# Patient Record
Sex: MALE | Race: BLACK OR AFRICAN AMERICAN | NOT HISPANIC OR LATINO | Employment: UNEMPLOYED | URBAN - NONMETROPOLITAN AREA
[De-identification: names, ages, dates, MRNs, and addresses within clinical notes are randomized per-mention and may not be internally consistent; named-entity substitution may affect disease eponyms.]

---

## 2022-03-15 ENCOUNTER — HOSPITAL ENCOUNTER (EMERGENCY)
Facility: HOSPITAL | Age: 37
Discharge: HOME | End: 2022-03-15
Attending: FAMILY MEDICINE | Admitting: FAMILY MEDICINE

## 2022-03-15 ENCOUNTER — APPOINTMENT (OUTPATIENT)
Dept: GENERAL RADIOLOGY | Facility: HOSPITAL | Age: 37
End: 2022-03-15

## 2022-03-15 VITALS
WEIGHT: 175.6 LBS | BODY MASS INDEX: 28.22 KG/M2 | HEART RATE: 76 BPM | SYSTOLIC BLOOD PRESSURE: 118 MMHG | RESPIRATION RATE: 20 BRPM | OXYGEN SATURATION: 97 % | HEIGHT: 66 IN | DIASTOLIC BLOOD PRESSURE: 71 MMHG | TEMPERATURE: 98.4 F

## 2022-03-15 DIAGNOSIS — R06.02 SHORTNESS OF BREATH: Primary | ICD-10-CM

## 2022-03-15 LAB
ALBUMIN SERPL-MCNC: 4.5 G/DL (ref 3.5–5.2)
ALBUMIN/GLOB SERPL: 1.6 G/DL
ALP SERPL-CCNC: 62 U/L (ref 39–117)
ALT SERPL W P-5'-P-CCNC: 58 U/L (ref 1–41)
ANION GAP SERPL CALCULATED.3IONS-SCNC: 8 MMOL/L (ref 5–15)
ANISOCYTOSIS BLD QL: NORMAL
AST SERPL-CCNC: 33 U/L (ref 1–40)
BASOPHILS # BLD AUTO: 0.05 10*3/MM3 (ref 0–0.2)
BASOPHILS NFR BLD AUTO: 0.8 % (ref 0–1.5)
BILIRUB SERPL-MCNC: 0.3 MG/DL (ref 0–1.2)
BUN SERPL-MCNC: 13 MG/DL (ref 6–20)
BUN/CREAT SERPL: 11 (ref 7–25)
CALCIUM SPEC-SCNC: 9.3 MG/DL (ref 8.6–10.5)
CHLORIDE SERPL-SCNC: 102 MMOL/L (ref 98–107)
CO2 SERPL-SCNC: 28 MMOL/L (ref 22–29)
CREAT SERPL-MCNC: 1.18 MG/DL (ref 0.76–1.27)
DEPRECATED RDW RBC AUTO: 35.6 FL (ref 37–54)
EGFRCR SERPLBLD CKD-EPI 2021: 82 ML/MIN/1.73
EOSINOPHIL # BLD AUTO: 0.38 10*3/MM3 (ref 0–0.4)
EOSINOPHIL NFR BLD AUTO: 5.7 % (ref 0.3–6.2)
ERYTHROCYTE [DISTWIDTH] IN BLOOD BY AUTOMATED COUNT: 18.6 % (ref 12.3–15.4)
GLOBULIN UR ELPH-MCNC: 2.8 GM/DL
GLUCOSE SERPL-MCNC: 98 MG/DL (ref 65–99)
HCT VFR BLD AUTO: 41.8 % (ref 37.5–51)
HGB BLD-MCNC: 13.6 G/DL (ref 13–17.7)
HOLD SPECIMEN: NORMAL
HYPOCHROMIA BLD QL: NORMAL
IMM GRANULOCYTES # BLD AUTO: 0.04 10*3/MM3 (ref 0–0.05)
IMM GRANULOCYTES NFR BLD AUTO: 0.6 % (ref 0–0.5)
LYMPHOCYTES # BLD AUTO: 1.83 10*3/MM3 (ref 0.7–3.1)
LYMPHOCYTES NFR BLD AUTO: 27.5 % (ref 19.6–45.3)
MCH RBC QN AUTO: 20.5 PG (ref 26.6–33)
MCHC RBC AUTO-ENTMCNC: 32.5 G/DL (ref 31.5–35.7)
MCV RBC AUTO: 62.9 FL (ref 79–97)
MICROCYTES BLD QL: NORMAL
MONOCYTES # BLD AUTO: 0.53 10*3/MM3 (ref 0.1–0.9)
MONOCYTES NFR BLD AUTO: 8 % (ref 5–12)
NEUTROPHILS NFR BLD AUTO: 3.83 10*3/MM3 (ref 1.7–7)
NEUTROPHILS NFR BLD AUTO: 57.4 % (ref 42.7–76)
NRBC BLD AUTO-RTO: 0 /100 WBC (ref 0–0.2)
NT-PROBNP SERPL-MCNC: 5.4 PG/ML (ref 0–450)
OVALOCYTES BLD QL SMEAR: NORMAL
PLATELET # BLD AUTO: 277 10*3/MM3 (ref 140–450)
PMV BLD AUTO: 10.1 FL (ref 6–12)
POIKILOCYTOSIS BLD QL SMEAR: NORMAL
POTASSIUM SERPL-SCNC: 4.2 MMOL/L (ref 3.5–5.2)
PROT SERPL-MCNC: 7.3 G/DL (ref 6–8.5)
RBC # BLD AUTO: 6.65 10*6/MM3 (ref 4.14–5.8)
SMALL PLATELETS BLD QL SMEAR: ADEQUATE
SODIUM SERPL-SCNC: 138 MMOL/L (ref 136–145)
TARGETS BLD QL SMEAR: NORMAL
TROPONIN T SERPL-MCNC: <0.01 NG/ML (ref 0–0.03)
WBC MORPH BLD: NORMAL
WBC NRBC COR # BLD: 6.66 10*3/MM3 (ref 3.4–10.8)
WHOLE BLOOD HOLD SPECIMEN: NORMAL

## 2022-03-15 PROCEDURE — 85007 BL SMEAR W/DIFF WBC COUNT: CPT | Performed by: PHYSICIAN ASSISTANT

## 2022-03-15 PROCEDURE — 93005 ELECTROCARDIOGRAM TRACING: CPT

## 2022-03-15 PROCEDURE — 83880 ASSAY OF NATRIURETIC PEPTIDE: CPT | Performed by: PHYSICIAN ASSISTANT

## 2022-03-15 PROCEDURE — 84484 ASSAY OF TROPONIN QUANT: CPT | Performed by: PHYSICIAN ASSISTANT

## 2022-03-15 PROCEDURE — 93005 ELECTROCARDIOGRAM TRACING: CPT | Performed by: FAMILY MEDICINE

## 2022-03-15 PROCEDURE — 85025 COMPLETE CBC W/AUTO DIFF WBC: CPT | Performed by: PHYSICIAN ASSISTANT

## 2022-03-15 PROCEDURE — 71045 X-RAY EXAM CHEST 1 VIEW: CPT

## 2022-03-15 PROCEDURE — 80053 COMPREHEN METABOLIC PANEL: CPT | Performed by: PHYSICIAN ASSISTANT

## 2022-03-15 PROCEDURE — 99283 EMERGENCY DEPT VISIT LOW MDM: CPT

## 2022-03-15 PROCEDURE — 93010 ELECTROCARDIOGRAM REPORT: CPT | Performed by: INTERNAL MEDICINE

## 2022-03-15 RX ORDER — SODIUM CHLORIDE 0.9 % (FLUSH) 0.9 %
10 SYRINGE (ML) INJECTION AS NEEDED
Status: DISCONTINUED | OUTPATIENT
Start: 2022-03-15 | End: 2022-03-15 | Stop reason: HOSPADM

## 2022-03-16 LAB
QT INTERVAL: 328 MS
QTC INTERVAL: 399 MS

## 2022-03-28 ENCOUNTER — TELEPHONE (OUTPATIENT)
Dept: FAMILY MEDICINE CLINIC | Facility: CLINIC | Age: 37
End: 2022-03-28

## 2022-03-28 NOTE — TELEPHONE ENCOUNTER
Called to remind of new patient appointment (bring ID, insurance, list of doctors seen and medications), and to reschedule if necessary according to covid screening. No answer and voicemail full.

## 2022-03-29 ENCOUNTER — TELEPHONE (OUTPATIENT)
Dept: FAMILY MEDICINE CLINIC | Facility: CLINIC | Age: 37
End: 2022-03-29

## 2022-04-05 ENCOUNTER — TELEPHONE (OUTPATIENT)
Dept: FAMILY MEDICINE CLINIC | Facility: CLINIC | Age: 37
End: 2022-04-05

## 2022-04-05 NOTE — TELEPHONE ENCOUNTER
Called to remind of new patient appointment (bring ID, insurance, list of doctors and medications), and to reschedule if necessary according to covid screening. No answer and no voicemail set up.

## 2022-04-29 ENCOUNTER — OFFICE VISIT (OUTPATIENT)
Dept: FAMILY MEDICINE CLINIC | Facility: CLINIC | Age: 37
End: 2022-04-29

## 2022-04-29 VITALS
HEIGHT: 66 IN | OXYGEN SATURATION: 98 % | TEMPERATURE: 97.8 F | WEIGHT: 188.4 LBS | DIASTOLIC BLOOD PRESSURE: 90 MMHG | HEART RATE: 93 BPM | BODY MASS INDEX: 30.28 KG/M2 | SYSTOLIC BLOOD PRESSURE: 130 MMHG

## 2022-04-29 DIAGNOSIS — J45.30 MILD PERSISTENT ASTHMA WITHOUT COMPLICATION: ICD-10-CM

## 2022-04-29 DIAGNOSIS — R06.02 SOB (SHORTNESS OF BREATH): Primary | ICD-10-CM

## 2022-04-29 PROBLEM — K21.9 GASTROESOPHAGEAL REFLUX DISEASE WITHOUT ESOPHAGITIS: Status: ACTIVE | Noted: 2021-02-10

## 2022-04-29 PROBLEM — F12.90 MARIJUANA USER: Status: ACTIVE | Noted: 2021-02-10

## 2022-04-29 LAB
QT INTERVAL: 354 MS
QTC INTERVAL: 403 MS

## 2022-04-29 PROCEDURE — 99203 OFFICE O/P NEW LOW 30 MIN: CPT | Performed by: STUDENT IN AN ORGANIZED HEALTH CARE EDUCATION/TRAINING PROGRAM

## 2022-04-29 PROCEDURE — 93010 ELECTROCARDIOGRAM REPORT: CPT | Performed by: INTERNAL MEDICINE

## 2022-04-29 PROCEDURE — 93005 ELECTROCARDIOGRAM TRACING: CPT | Performed by: STUDENT IN AN ORGANIZED HEALTH CARE EDUCATION/TRAINING PROGRAM

## 2022-04-29 RX ORDER — BUDESONIDE AND FORMOTEROL FUMARATE DIHYDRATE 160; 4.5 UG/1; UG/1
AEROSOL RESPIRATORY (INHALATION)
COMMUNITY
Start: 2022-04-28 | End: 2022-04-29

## 2022-04-29 RX ORDER — OMEPRAZOLE 20 MG/1
20 CAPSULE, DELAYED RELEASE ORAL DAILY
COMMUNITY
Start: 2022-04-20

## 2022-04-29 RX ORDER — BUDESONIDE 0.25 MG/2ML
0.25 INHALANT ORAL
COMMUNITY

## 2022-04-29 RX ORDER — LORATADINE 10 MG/1
10 TABLET ORAL DAILY
COMMUNITY
Start: 2022-03-16

## 2022-04-29 NOTE — PROGRESS NOTES
"Subjective:  Mike Monahan is a 36 y.o. male who presents for establish care    SOA; States had COVID in beginning of February. States that since then has had intermittent episodes of dyspnea. States issues has slowly gotten better. Denied any wheezing, cough, congestion, runny nose, loss of taste, loss of smell. Denied any chest pain, orthopnea, leg swelling.  was seen by a pulmonologist yesterday, had a breathing test which was significant for asthma. Was started on Pulmicort and albuterol as needed.     Patient Active Problem List   Diagnosis   • Gastroesophageal reflux disease without esophagitis   • Marijuana user     Vitals:    Vitals:    22 1029   BP: 130/90   BP Location: Left arm   Patient Position: Sitting   Cuff Size: Adult   Pulse: 93   Temp: 97.8 °F (36.6 °C)   SpO2: 98%   Weight: 85.5 kg (188 lb 6.4 oz)   Height: 167.6 cm (66\")     Body mass index is 30.41 kg/m².    Current Outpatient Medications:   •  budesonide (PULMICORT) 0.25 MG/2ML nebulizer solution, Take 0.25 mg by nebulization Daily., Disp: , Rfl:   •  loratadine (CLARITIN) 10 MG tablet, Take 10 mg by mouth Daily., Disp: , Rfl:   •  omeprazole (priLOSEC) 20 MG capsule, Take 20 mg by mouth Daily., Disp: , Rfl:     Patient Active Problem List   Diagnosis   • Gastroesophageal reflux disease without esophagitis   • Marijuana user     History reviewed. No pertinent surgical history.  Social History     Socioeconomic History   • Marital status: Single   Tobacco Use   • Smoking status: Former Smoker     Quit date: 1/15/2022     Years since quittin.2   • Smokeless tobacco: Never Used     History reviewed. No pertinent family history.  Admission on 03/15/2022, Discharged on 03/15/2022   Component Date Value Ref Range Status   • QT Interval 03/15/2022 328  ms Final   • QTC Interval 03/15/2022 399  ms Final   • Glucose 03/15/2022 98  65 - 99 mg/dL Final   • BUN 03/15/2022 13  6 - 20 mg/dL Final   • Creatinine 03/15/2022 1.18  0.76 - 1.27 " mg/dL Final   • Sodium 03/15/2022 138  136 - 145 mmol/L Final   • Potassium 03/15/2022 4.2  3.5 - 5.2 mmol/L Final   • Chloride 03/15/2022 102  98 - 107 mmol/L Final   • CO2 03/15/2022 28.0  22.0 - 29.0 mmol/L Final   • Calcium 03/15/2022 9.3  8.6 - 10.5 mg/dL Final   • Total Protein 03/15/2022 7.3  6.0 - 8.5 g/dL Final   • Albumin 03/15/2022 4.50  3.50 - 5.20 g/dL Final   • ALT (SGPT) 03/15/2022 58 (A) 1 - 41 U/L Final   • AST (SGOT) 03/15/2022 33  1 - 40 U/L Final   • Alkaline Phosphatase 03/15/2022 62  39 - 117 U/L Final   • Total Bilirubin 03/15/2022 0.3  0.0 - 1.2 mg/dL Final   • Globulin 03/15/2022 2.8  gm/dL Final   • A/G Ratio 03/15/2022 1.6  g/dL Final   • BUN/Creatinine Ratio 03/15/2022 11.0  7.0 - 25.0 Final   • Anion Gap 03/15/2022 8.0  5.0 - 15.0 mmol/L Final   • eGFR 03/15/2022 82.0  >60.0 mL/min/1.73 Final    National Kidney Foundation and American Society of Nephrology (ASN) Task Force recommended calculation based on the Chronic Kidney Disease Epidemiology Collaboration (CKD-EPI) equation refit without adjustment for race.   • Troponin T 03/15/2022 <0.010  0.000 - 0.030 ng/mL Final   • proBNP 03/15/2022 5.4  0.0 - 450.0 pg/mL Final   • WBC 03/15/2022 6.66  3.40 - 10.80 10*3/mm3 Final   • RBC 03/15/2022 6.65 (A) 4.14 - 5.80 10*6/mm3 Final   • Hemoglobin 03/15/2022 13.6  13.0 - 17.7 g/dL Final   • Hematocrit 03/15/2022 41.8  37.5 - 51.0 % Final   • MCV 03/15/2022 62.9 (A) 79.0 - 97.0 fL Final   • MCH 03/15/2022 20.5 (A) 26.6 - 33.0 pg Final   • MCHC 03/15/2022 32.5  31.5 - 35.7 g/dL Final   • RDW 03/15/2022 18.6 (A) 12.3 - 15.4 % Final   • RDW-SD 03/15/2022 35.6 (A) 37.0 - 54.0 fl Final   • MPV 03/15/2022 10.1  6.0 - 12.0 fL Final   • Platelets 03/15/2022 277  140 - 450 10*3/mm3 Final   • Neutrophil % 03/15/2022 57.4  42.7 - 76.0 % Final   • Lymphocyte % 03/15/2022 27.5  19.6 - 45.3 % Final   • Monocyte % 03/15/2022 8.0  5.0 - 12.0 % Final   • Eosinophil % 03/15/2022 5.7  0.3 - 6.2 % Final   •  Basophil % 03/15/2022 0.8  0.0 - 1.5 % Final   • Immature Grans % 03/15/2022 0.6 (A) 0.0 - 0.5 % Final   • Neutrophils, Absolute 03/15/2022 3.83  1.70 - 7.00 10*3/mm3 Final   • Lymphocytes, Absolute 03/15/2022 1.83  0.70 - 3.10 10*3/mm3 Final   • Monocytes, Absolute 03/15/2022 0.53  0.10 - 0.90 10*3/mm3 Final   • Eosinophils, Absolute 03/15/2022 0.38  0.00 - 0.40 10*3/mm3 Final   • Basophils, Absolute 03/15/2022 0.05  0.00 - 0.20 10*3/mm3 Final   • Immature Grans, Absolute 03/15/2022 0.04  0.00 - 0.05 10*3/mm3 Final   • nRBC 03/15/2022 0.0  0.0 - 0.2 /100 WBC Final   • Extra Tube 03/15/2022 Hold for add-ons.   Final    Auto resulted.   • Extra Tube 03/15/2022 hold for add-on   Final    Auto resulted   • Anisocytosis 03/15/2022 Mod/2+  None Seen Final   • Hypochromia 03/15/2022 Mod/2+  None Seen Final   • Microcytes 03/15/2022 Mod/2+  None Seen Final   • Ovalocytes 03/15/2022 Slight/1+  None Seen Final   • Poikilocytes 03/15/2022 Slight/1+  None Seen Final   • Target Cells 03/15/2022 Slight/1+  None Seen Final   • WBC Morphology 03/15/2022 Normal  Normal Final   • Platelet Estimate 03/15/2022 Adequate  Normal Final      XR Chest 1 View  Narrative: EXAMINATION:  XR CHEST 1 VW    CLINICAL HISTORY:  36 years Male,shortness of breath    COMPARISON:  None    FINDINGS:  No focal airspace consolidation. No pleural effusion  or pneumothorax. Normal heart size and pulmonary vascularity.  Impression: No acute cardiopulmonary process.    Electronically signed by:  Kadeem Garcia MD  3/15/2022 12:18 PM  CDT Workstation: [id number]    @[email or web address]@  Immunization History   Administered Date(s) Administered   • Flu Vaccine Quad PF >36MO 02/01/2020   • Hep B, Adolescent or Pediatric 06/05/2000, 07/06/2000, 10/05/2000   • MMR 01/17/1996   • Td 06/05/2000     The following portions of the patient's history were reviewed and updated as appropriate: allergies, current medications, past family history, past medical history, past social  history, past surgical history and problem list.    PHQ-9 Total Score: 4         Physical Exam  Constitutional:       General: He is not in acute distress.  HENT:      Head: Normocephalic and atraumatic.   Cardiovascular:      Rate and Rhythm: Normal rate and regular rhythm.      Heart sounds: Normal heart sounds. No murmur heard.  Pulmonary:      Effort: Pulmonary effort is normal.      Breath sounds: Normal breath sounds.   Abdominal:      Palpations: Abdomen is soft.      Tenderness: There is no abdominal tenderness.   Musculoskeletal:         General: No swelling.      Right lower leg: No edema.      Left lower leg: No edema.   Skin:     Coloration: Skin is not jaundiced.      Findings: No rash.   Neurological:      Mental Status: He is alert and oriented to person, place, and time. Mental status is at baseline.   Psychiatric:         Mood and Affect: Mood normal.         Behavior: Behavior normal.         Assessment/Plan    Diagnosis Plan   1. SOB (shortness of breath)  ECG 12 Lead   2. Mild persistent asthma without complication        Orders Placed This Encounter   Procedures   • ECG 12 Lead     Order Specific Question:   Reason for Exam:     Answer:   SOA     Order Specific Question:   Release to patient     Answer:   Immediate     Shortness of breath; started after COVID, advised importance of smoke cessation, follow-up with pulmonology.  Vital stable, exam reassuring.  Labs reviewed from ER, reassuring.  Strict ER/return precaution given, EKG performed today with no acute changes.  Given strict cardiac/ER precautions.  Patient voiced understanding, agreeable to plan.  Follow-up in 6 months, sooner if needed.          This document has been electronically signed by Aaron Barnett MD on April 29, 2022 17:31 CDT    EMR Dragon/Transciption Disclaimer: Some of this note may be an electronic transcription/translation of spoken language to printed text.  The electronic translation of spoken language may permit  erroneous, or at times, nonsensical words or phrases to be inadvertently transcribed. Although I have reviewed the note for such errors, some may still exist.